# Patient Record
Sex: FEMALE | Race: WHITE | NOT HISPANIC OR LATINO
[De-identification: names, ages, dates, MRNs, and addresses within clinical notes are randomized per-mention and may not be internally consistent; named-entity substitution may affect disease eponyms.]

---

## 2023-01-31 PROBLEM — Z00.00 ENCOUNTER FOR PREVENTIVE HEALTH EXAMINATION: Status: ACTIVE | Noted: 2023-01-31

## 2023-02-01 ENCOUNTER — APPOINTMENT (OUTPATIENT)
Dept: OTOLARYNGOLOGY | Facility: CLINIC | Age: 23
End: 2023-02-01
Payer: COMMERCIAL

## 2023-02-01 ENCOUNTER — NON-APPOINTMENT (OUTPATIENT)
Age: 23
End: 2023-02-01

## 2023-02-01 ENCOUNTER — RESULT REVIEW (OUTPATIENT)
Age: 23
End: 2023-02-01

## 2023-02-01 VITALS
SYSTOLIC BLOOD PRESSURE: 127 MMHG | WEIGHT: 293 LBS | HEIGHT: 68 IN | DIASTOLIC BLOOD PRESSURE: 82 MMHG | HEART RATE: 89 BPM | BODY MASS INDEX: 44.41 KG/M2

## 2023-02-01 DIAGNOSIS — J00 ACUTE NASOPHARYNGITIS [COMMON COLD]: ICD-10-CM

## 2023-02-01 DIAGNOSIS — J35.01 CHRONIC TONSILLITIS: ICD-10-CM

## 2023-02-01 PROCEDURE — 99244 OFF/OP CNSLTJ NEW/EST MOD 40: CPT | Mod: 25

## 2023-02-01 PROCEDURE — 31231 NASAL ENDOSCOPY DX: CPT

## 2023-02-01 PROCEDURE — 70486 CT MAXILLOFACIAL W/O DYE: CPT

## 2023-02-01 NOTE — REVIEW OF SYSTEMS
[Post Nasal Drip] : post nasal drip [Ear Pain] : ear pain [Ear Drainage] : ear drainage [Nasal Congestion] : nasal congestion [Sinus Pressure] : sinus pressure [Sense Of Smell Problem] : sense of smell problem [Discolored Nasal Discharge] : discolored nasal discharge [Negative] : Heme/Lymph

## 2023-03-16 ENCOUNTER — APPOINTMENT (OUTPATIENT)
Dept: OTOLARYNGOLOGY | Facility: AMBULATORY MEDICAL SERVICES | Age: 23
End: 2023-03-16

## 2023-03-24 ENCOUNTER — APPOINTMENT (OUTPATIENT)
Dept: OTOLARYNGOLOGY | Facility: CLINIC | Age: 23
End: 2023-03-24

## 2023-04-05 NOTE — ADDENDUM
[FreeTextEntry1] : Documented by Marina Ramírez acting as scribe for Dr. Avila on 02/01/2023.\par \par All Medical record entries made by the scribe were at my, Dr. Avila,direction and personally dictated by me on 02/01/2023. I have reviewed the chart and agree that the record accurately reflects my personal performance of the history, physical exam, assessment and plan. I have also personally directed, reviewed, and agreed with the discharge instructions.

## 2023-04-05 NOTE — ASSESSMENT
11/05/18 0917   Provider Notification   Provider Name/Title Dr. Jones   Method of Notification At Bedside   Request Evaluate in Person   Notification Reason SVE;Membrane Status     Dr. Jones at bedside. SVE 1/25/-3. Attempted rupturing membranes with amniohook and fetal scalp electrode without success. Plan is to initiate pitocin for induction.   [FreeTextEntry1] : Reviewed and reconciled medications, allergies, PMHx, PSHx, SocHx, FMHx.\par \par Pt presents today c/o recurrent sinus infeftions - multiple times a year, PND, tonsillitis treated with 2 courses of abx, felt like she couldn't breathe but resolved now, right ear clogged. Pt notes she thinks it might have to do with her adenoids being enlarged. Pt notes she took Flonase nasal spray for pressure feeling and it resolved within a few days. \par \par Physical Exam -\par cerumen removed b/l\par maxillaries tender\par mildly deviated septum, mildly inflamed turbs \par tonsils class 2\par \par Flexible nasal endoscopy \par left: large middle turb, minimal adenoid tissue (20%)\par right: large polyp coming out of middle meatus\par \par Mini CT Sinus\par right maxillary polyp, ethmoid clean, sphenoids clean, frontals clean\par large turbinates\par right adenoid slightly larger than left\par \par Plan:\par Cerumen removed b/l. Flexible nasal endoscopy. Mini CT Sinus - interpreted by Dr. Avila and reviewed with the patient, pending radiologist review. Continue taking Flonase - 2 sprays bilaterally QD, spray laterally. Discussed r/b/a of maxillary and turbs and possible adenoids surgery. FU for surgery.

## 2023-04-05 NOTE — REASON FOR VISIT
[Initial Evaluation] : an initial evaluation for [FreeTextEntry2] : Rt Ear pressure/ discuss tonsil sx

## 2023-04-05 NOTE — PHYSICAL EXAM
[Midline] : trachea located in midline position [Normal] : orientation to person, place, and time: normal [FreeTextEntry8] : cerumen removed via curettage  [FreeTextEntry9] : cerumen removed via curettage  [de-identified] : mildly deviated septum right [de-identified] : mildly inflamed turbs  [de-identified] : class 2 [FreeTextEntry2] : maxillaries tender

## 2023-04-05 NOTE — CONSULT LETTER
[Dear  ___] : Dear  [unfilled], [Consult Letter:] : I had the pleasure of evaluating your patient, [unfilled]. [Please see my note below.] : Please see my note below. [Consult Closing:] : Thank you very much for allowing me to participate in the care of this patient.  If you have any questions, please do not hesitate to contact me. [Sincerely,] : Sincerely, [FreeTextEntry3] : Philipp Avila MD FACS

## 2023-04-05 NOTE — PROCEDURE
[Recalcitrant Symptoms] : recalcitrant symptoms  [None] : none [Flexible Endoscope] : examined with the flexible endoscope [FreeTextEntry6] : Procedure: Flexible Nasal Endoscopy: Risks, benefits, and alternatives of flexible endoscopy were explained to the patient. The patient gave oral consent to proceed. The flexible scope was inserted into the right nasal cavity. Endoscopy of the inferior and middle meatus was performed. large polyp coming out of middle meatus. Olfactory cleft was clear. Spheno-ethmoid recess is clear. Nasopharynx was clear. Turbinates were without mass. The procedure was repeated on the contralateral side with large middle turb, minimal adenoid tissue (20%).  [de-identified] : recurrent sinus infections

## 2023-04-05 NOTE — HISTORY OF PRESENT ILLNESS
[de-identified] : Pt presents today c/o recurrent sinus infetions - multiple times a year, PND, tonsillitis treated with 2 courses of abx, felt like she couldn't breathe but resolved now, right ear clogged. Pt notes she thinks it might have to do with her adenoids being enlarged. Pt notes she took Flonase nasal spray for pressure feeling and it resolved within a few days but she cant use nasal sprays continuous.

## 2023-04-13 ENCOUNTER — OUTPATIENT (OUTPATIENT)
Dept: OUTPATIENT SERVICES | Facility: HOSPITAL | Age: 23
LOS: 1 days | End: 2023-04-13
Payer: COMMERCIAL

## 2023-04-13 VITALS
HEART RATE: 79 BPM | OXYGEN SATURATION: 98 % | TEMPERATURE: 98 F | HEIGHT: 68 IN | WEIGHT: 293 LBS | DIASTOLIC BLOOD PRESSURE: 86 MMHG | SYSTOLIC BLOOD PRESSURE: 142 MMHG | RESPIRATION RATE: 16 BRPM

## 2023-04-13 DIAGNOSIS — J31.0 CHRONIC RHINITIS: ICD-10-CM

## 2023-04-13 DIAGNOSIS — J32.9 CHRONIC SINUSITIS, UNSPECIFIED: ICD-10-CM

## 2023-04-13 DIAGNOSIS — J33.9 NASAL POLYP, UNSPECIFIED: ICD-10-CM

## 2023-04-13 DIAGNOSIS — J00 ACUTE NASOPHARYNGITIS [COMMON COLD]: ICD-10-CM

## 2023-04-13 DIAGNOSIS — Z01.818 ENCOUNTER FOR OTHER PREPROCEDURAL EXAMINATION: ICD-10-CM

## 2023-04-13 DIAGNOSIS — Z98.890 OTHER SPECIFIED POSTPROCEDURAL STATES: Chronic | ICD-10-CM

## 2023-04-13 LAB
HCG SERPL-ACNC: <1 MIU/ML — SIGNIFICANT CHANGE UP
HCT VFR BLD CALC: 40.5 % — SIGNIFICANT CHANGE UP (ref 34.5–45)
HGB BLD-MCNC: 12.6 G/DL — SIGNIFICANT CHANGE UP (ref 11.5–15.5)
MCHC RBC-ENTMCNC: 26.7 PG — LOW (ref 27–34)
MCHC RBC-ENTMCNC: 31.1 GM/DL — LOW (ref 32–36)
MCV RBC AUTO: 85.8 FL — SIGNIFICANT CHANGE UP (ref 80–100)
NRBC # BLD: 0 /100 WBCS — SIGNIFICANT CHANGE UP (ref 0–0)
PLATELET # BLD AUTO: 249 K/UL — SIGNIFICANT CHANGE UP (ref 150–400)
RBC # BLD: 4.72 M/UL — SIGNIFICANT CHANGE UP (ref 3.8–5.2)
RBC # FLD: 13.4 % — SIGNIFICANT CHANGE UP (ref 10.3–14.5)
WBC # BLD: 5.75 K/UL — SIGNIFICANT CHANGE UP (ref 3.8–10.5)
WBC # FLD AUTO: 5.75 K/UL — SIGNIFICANT CHANGE UP (ref 3.8–10.5)

## 2023-04-13 PROCEDURE — 84702 CHORIONIC GONADOTROPIN TEST: CPT

## 2023-04-13 PROCEDURE — 36415 COLL VENOUS BLD VENIPUNCTURE: CPT

## 2023-04-13 PROCEDURE — G0463: CPT

## 2023-04-13 PROCEDURE — 85027 COMPLETE CBC AUTOMATED: CPT

## 2023-04-13 NOTE — H&P PST ADULT - NSICDXPASTMEDICALHX_GEN_ALL_CORE_FT
PAST MEDICAL HISTORY:  Mild hyperlipidemia     Nasal polyp     Recurrent sinus infections     Severe obesity (BMI >= 40)

## 2023-04-13 NOTE — H&P PST ADULT - NSICDXFAMILYHX_GEN_ALL_CORE_FT
FAMILY HISTORY:  Father  Still living? Unknown  FH: stroke, Age at diagnosis: Age Unknown  FH: type 2 diabetes, Age at diagnosis: Age Unknown    Grandparent  Still living? Unknown  FH: type 2 diabetes, Age at diagnosis: Age Unknown    Uncle  Still living? Yes, Estimated age: Age Unknown  FH: stroke, Age at diagnosis: Age Unknown  FH: type 2 diabetes, Age at diagnosis: Age Unknown

## 2023-04-13 NOTE — H&P PST ADULT - PROBLEM SELECTOR PLAN 4
Labs CBC and HCG  Medical clearance necessary  Pre op instructions reviewed and given.   No meds to take am of surgery.   Instructed to hold and/or avoid other NSAIDs and OTC supplements. Tylenol is ok. Verbalized understanding

## 2023-04-13 NOTE — H&P PST ADULT - HISTORY OF PRESENT ILLNESS
21 y/o female with PMH of recurrent sinus infections for PST having Right Maxillary Antrostomy - Removal Contents- Bilateral Electrosubmucous Resection Turbinectomy- Adenoidectomy with Tracker by Dr. Avila on 4/20/2023.  She reports multiple sinus, ear, and throat infections the past 6 months.  Seen by ENT told of polyp here for elective corrective procedure.

## 2023-04-13 NOTE — H&P PST ADULT - PROBLEM SELECTOR PLAN 3
Right Maxillary Antrostomy - Removal Contents- Bilateral Electrosubmucous Resection Turbinectomy- Adenoidectomy with Tracker by Dr. Avila on 4/20/2023.

## 2023-04-19 ENCOUNTER — TRANSCRIPTION ENCOUNTER (OUTPATIENT)
Age: 23
End: 2023-04-19

## 2023-04-19 RX ORDER — METOCLOPRAMIDE HCL 10 MG
10 TABLET ORAL ONCE
Refills: 0 | Status: DISCONTINUED | OUTPATIENT
Start: 2023-04-20 | End: 2023-04-21

## 2023-04-19 RX ORDER — OXYCODONE HYDROCHLORIDE 5 MG/1
5 TABLET ORAL ONCE
Refills: 0 | Status: DISCONTINUED | OUTPATIENT
Start: 2023-04-20 | End: 2023-04-21

## 2023-04-19 RX ORDER — HYDROMORPHONE HYDROCHLORIDE 2 MG/ML
0.5 INJECTION INTRAMUSCULAR; INTRAVENOUS; SUBCUTANEOUS
Refills: 0 | Status: DISCONTINUED | OUTPATIENT
Start: 2023-04-20 | End: 2023-04-21

## 2023-04-19 RX ORDER — SODIUM CHLORIDE 9 MG/ML
1000 INJECTION, SOLUTION INTRAVENOUS
Refills: 0 | Status: DISCONTINUED | OUTPATIENT
Start: 2023-04-20 | End: 2023-04-21

## 2023-04-19 NOTE — ASU PATIENT PROFILE, ADULT - FALL HARM RISK - UNIVERSAL INTERVENTIONS
Bed in lowest position, wheels locked, appropriate side rails in place/Call bell, personal items and telephone in reach/Instruct patient to call for assistance before getting out of bed or chair/Non-slip footwear when patient is out of bed/[place] to call system/Physically safe environment - no spills, clutter or unnecessary equipment/Purposeful Proactive Rounding/Room/bathroom lighting operational, light cord in reach

## 2023-04-19 NOTE — ASU PATIENT PROFILE, ADULT - SITE
Right Maxillary Antrostomy- Removal Contents- Bilateral Electrosubmucous Resection Turbinectomy- Adenoidectomy With Tracker

## 2023-04-20 ENCOUNTER — APPOINTMENT (OUTPATIENT)
Dept: OTOLARYNGOLOGY | Facility: HOSPITAL | Age: 23
End: 2023-04-20

## 2023-04-20 ENCOUNTER — RESULT REVIEW (OUTPATIENT)
Age: 23
End: 2023-04-20

## 2023-04-20 ENCOUNTER — TRANSCRIPTION ENCOUNTER (OUTPATIENT)
Age: 23
End: 2023-04-20

## 2023-04-20 ENCOUNTER — OUTPATIENT (OUTPATIENT)
Dept: OUTPATIENT SERVICES | Facility: HOSPITAL | Age: 23
LOS: 1 days | End: 2023-04-20
Payer: COMMERCIAL

## 2023-04-20 VITALS
HEART RATE: 83 BPM | OXYGEN SATURATION: 99 % | SYSTOLIC BLOOD PRESSURE: 123 MMHG | DIASTOLIC BLOOD PRESSURE: 75 MMHG | RESPIRATION RATE: 15 BRPM | WEIGHT: 293 LBS | TEMPERATURE: 98 F | HEIGHT: 68 IN

## 2023-04-20 VITALS
HEART RATE: 86 BPM | SYSTOLIC BLOOD PRESSURE: 137 MMHG | DIASTOLIC BLOOD PRESSURE: 80 MMHG | RESPIRATION RATE: 16 BRPM | OXYGEN SATURATION: 98 %

## 2023-04-20 DIAGNOSIS — J00 ACUTE NASOPHARYNGITIS [COMMON COLD]: ICD-10-CM

## 2023-04-20 DIAGNOSIS — J33.9 NASAL POLYP, UNSPECIFIED: ICD-10-CM

## 2023-04-20 DIAGNOSIS — Z98.890 OTHER SPECIFIED POSTPROCEDURAL STATES: Chronic | ICD-10-CM

## 2023-04-20 DIAGNOSIS — J31.0 CHRONIC RHINITIS: ICD-10-CM

## 2023-04-20 DIAGNOSIS — J32.9 CHRONIC SINUSITIS, UNSPECIFIED: ICD-10-CM

## 2023-04-20 PROBLEM — E78.5 HYPERLIPIDEMIA, UNSPECIFIED: Chronic | Status: ACTIVE | Noted: 2023-04-13

## 2023-04-20 PROCEDURE — 88305 TISSUE EXAM BY PATHOLOGIST: CPT | Mod: 26

## 2023-04-20 PROCEDURE — 30802 ABLATE INF TURBINATE SUBMUC: CPT

## 2023-04-20 PROCEDURE — 31267 ENDOSCOPY MAXILLARY SINUS: CPT | Mod: RT

## 2023-04-20 PROCEDURE — 42831 REMOVAL OF ADENOIDS: CPT

## 2023-04-20 PROCEDURE — 88305 TISSUE EXAM BY PATHOLOGIST: CPT

## 2023-04-20 PROCEDURE — 30140 RESECT INFERIOR TURBINATE: CPT | Mod: 50

## 2023-04-20 PROCEDURE — C9399: CPT

## 2023-04-20 PROCEDURE — 31240 NSL/SNS NDSC CNCH BULL RESCJ: CPT | Mod: 50

## 2023-04-20 PROCEDURE — 31255 NSL/SINS NDSC W/TOT ETHMDCT: CPT | Mod: RT

## 2023-04-20 PROCEDURE — 88311 DECALCIFY TISSUE: CPT | Mod: 26

## 2023-04-20 PROCEDURE — 88311 DECALCIFY TISSUE: CPT

## 2023-04-20 RX ORDER — PREGABALIN 225 MG/1
1 CAPSULE ORAL
Refills: 0 | DISCHARGE

## 2023-04-20 RX ORDER — CEPHALEXIN 500 MG/1
500 CAPSULE ORAL
Qty: 20 | Refills: 0 | Status: ACTIVE | COMMUNITY
Start: 2023-04-20 | End: 1900-01-01

## 2023-04-20 RX ORDER — CEFAZOLIN SODIUM 1 G
3000 VIAL (EA) INJECTION ONCE
Refills: 0 | Status: COMPLETED | OUTPATIENT
Start: 2023-04-20 | End: 2023-04-20

## 2023-04-20 RX ORDER — CHOLECALCIFEROL (VITAMIN D3) 125 MCG
1 CAPSULE ORAL
Refills: 0 | DISCHARGE

## 2023-04-20 RX ORDER — FERROUS SULFATE 325(65) MG
1 TABLET ORAL
Refills: 0 | DISCHARGE

## 2023-04-20 RX ORDER — FOLIC ACID 0.8 MG
1 TABLET ORAL
Refills: 0 | DISCHARGE

## 2023-04-20 RX ORDER — ACETAMINOPHEN AND CODEINE 300; 30 MG/1; MG/1
300-30 TABLET ORAL
Qty: 20 | Refills: 0 | Status: ACTIVE | COMMUNITY
Start: 2023-04-20 | End: 1900-01-01

## 2023-04-20 RX ADMIN — SODIUM CHLORIDE 150 MILLILITER(S): 9 INJECTION, SOLUTION INTRAVENOUS at 07:36

## 2023-04-20 RX ADMIN — SODIUM CHLORIDE 150 MILLILITER(S): 9 INJECTION, SOLUTION INTRAVENOUS at 09:40

## 2023-04-20 NOTE — BRIEF OPERATIVE NOTE - OPERATION/FINDINGS
right maxillary and ethmoid with polypoid disease. bilateral middle turb delbert bullosa. bilateral inferior turbinate hypertrphy, adenoid hypertrophy

## 2023-04-20 NOTE — BRIEF OPERATIVE NOTE - NSICDXBRIEFPROCEDURE_GEN_ALL_CORE_FT
PROCEDURES:  Endoscopic maxillary antrotomy 20-Apr-2023 09:51:44  Philipp Avila  Endoscopic total ethmoidectomy 20-Apr-2023 09:52:36  Philipp Avila  Adenoidectomy and turbinectomy 20-Apr-2023 09:53:25  Philipp Avila

## 2023-04-20 NOTE — BRIEF OPERATIVE NOTE - NSICDXBRIEFPREOP_GEN_ALL_CORE_FT
PRE-OP DIAGNOSIS:  Chronic maxillary sinusitis 20-Apr-2023 09:53:46  Philipp Avila  Chronic ethmoidal sinusitis 20-Apr-2023 09:53:56  Philipp Avila  Hypertrophy of inferior nasal turbinate 20-Apr-2023 09:54:05  Philipp Avila  Paula bullosa 20-Apr-2023 09:54:15  Philipp Avila  Obstructive adenoid tissue 20-Apr-2023 09:54:29  Philipp Avila

## 2023-04-20 NOTE — ASU DISCHARGE PLAN (ADULT/PEDIATRIC) - CARE PROVIDER_API CALL
Philipp Avila)  Otolaryngology  [address], [address]  [place] [address]  Phone: [phone]  Fax: [phone]  Follow Up Time: 2 weeks

## 2023-04-20 NOTE — ASU DISCHARGE PLAN (ADULT/PEDIATRIC) - ASU DC SPECIAL INSTRUCTIONSFT
Breath through the mouth for next 72 hours  Sneeze through the mouth until post op appointment  Do not wiggle nose or move nose 72 hours  Saline spray 6 times a day  Place light ice on nose and keep yourself elevated.  Keep head elevated and sleep with three pillows to keep yourself elevated while asleep  No nose blowing.   Diet tolerated    Take following medications as prescribed:  Take keflex 500 twice a day 10 days  Take Tylenol and Codeine as needed     You can expect fatigue, nasal stuffiness, and mild nasal drainage after your surgery. Pain is generally mild with this type of surgery and is typically well controlled with pain medications by mouth. The stuffiness typically results from swelling after the procedure, and typically starts to improve after the first week. You may have drainage of some mucus and blood from your nose after surgery. This is a normal part of the healing process.

## 2023-04-20 NOTE — ASU DISCHARGE PLAN (ADULT/PEDIATRIC) - PROCEDURE
Right Maxillary Antrostomy - Removal Contents- Bilateral Electrosubmucous Resection Turbinectomy- Adenoidectomy with Tracker

## 2023-04-20 NOTE — BRIEF OPERATIVE NOTE - NSICDXBRIEFPOSTOP_GEN_ALL_CORE_FT
POST-OP DIAGNOSIS:  Chronic ethmoidal sinusitis 20-Apr-2023 09:54:50  Philipp Avila  Maxillary sinusitis, chronic 20-Apr-2023 09:55:03  Philipp Avila  Hypertrophy of inferior nasal turbinate 20-Apr-2023 09:55:12  Philipp Avila  Paula bullosa 20-Apr-2023 09:55:20  Philipp Avila  Adenoid hypertrophy 20-Apr-2023 09:55:33  Philipp Avila

## 2023-04-21 ENCOUNTER — APPOINTMENT (OUTPATIENT)
Dept: OTOLARYNGOLOGY | Facility: CLINIC | Age: 23
End: 2023-04-21
Payer: COMMERCIAL

## 2023-04-21 VITALS
HEART RATE: 80 BPM | DIASTOLIC BLOOD PRESSURE: 79 MMHG | WEIGHT: 293 LBS | HEIGHT: 68 IN | SYSTOLIC BLOOD PRESSURE: 115 MMHG | BODY MASS INDEX: 44.41 KG/M2

## 2023-04-21 DIAGNOSIS — J33.9 NASAL POLYP, UNSPECIFIED: ICD-10-CM

## 2023-04-21 PROBLEM — J32.9 CHRONIC SINUSITIS, UNSPECIFIED: Chronic | Status: ACTIVE | Noted: 2023-04-13

## 2023-04-21 PROBLEM — E66.01 MORBID (SEVERE) OBESITY DUE TO EXCESS CALORIES: Chronic | Status: ACTIVE | Noted: 2023-04-13

## 2023-04-21 LAB — SURGICAL PATHOLOGY STUDY: SIGNIFICANT CHANGE UP

## 2023-04-21 PROCEDURE — 99024 POSTOP FOLLOW-UP VISIT: CPT

## 2023-04-21 NOTE — CONSULT LETTER
[Dear  ___] : Dear  [unfilled], [Courtesy Letter:] : I had the pleasure of seeing your patient, [unfilled], in my office today. [Please see my note below.] : Please see my note below. [Consult Closing:] : Thank you very much for allowing me to participate in the care of this patient.  If you have any questions, please do not hesitate to contact me. [Sincerely,] : Sincerely, [FreeTextEntry1] : Philipp Avila MD FACS

## 2023-04-21 NOTE — HISTORY OF PRESENT ILLNESS
[de-identified] : Pt. with h/o recurrent sinus infections - multiple times a year, PND, and tonsillitis treated with 2 courses of abx presents today s/p right maxillary antrostomy with removal of contents, adenoidectomy, and b/l electro SMR turbs. Patient presents stating she is concerned because she cant breath through her nose at all. Patient states she has been using the saline spray. She states she used it 6 times yesterday.

## 2023-04-21 NOTE — REASON FOR VISIT
[Post-Operative Visit] : a post-operative visit [FreeTextEntry2] : adenoidectomy/ polyp removal  Complex Repair And Flap Additional Text (Will Appearing After The Standard Complex Repair Text): The complex repair was not sufficient to completely close the primary

## 2023-04-27 ENCOUNTER — APPOINTMENT (OUTPATIENT)
Dept: OTOLARYNGOLOGY | Facility: CLINIC | Age: 23
End: 2023-04-27
Payer: COMMERCIAL

## 2023-04-27 VITALS
DIASTOLIC BLOOD PRESSURE: 84 MMHG | WEIGHT: 293 LBS | SYSTOLIC BLOOD PRESSURE: 116 MMHG | HEIGHT: 68 IN | BODY MASS INDEX: 44.41 KG/M2 | HEART RATE: 96 BPM

## 2023-04-27 DIAGNOSIS — G89.18 OTHER ACUTE POSTPROCEDURAL PAIN: ICD-10-CM

## 2023-04-27 PROCEDURE — 99024 POSTOP FOLLOW-UP VISIT: CPT

## 2023-04-27 NOTE — CONSULT LETTER
[Dear  ___] : Dear  [unfilled], [Courtesy Letter:] : I had the pleasure of seeing your patient, [unfilled], in my office today. [Please see my note below.] : Please see my note below. [Referral Closing:] : Thank you very much for seeing this patient.  If you have any questions, please do not hesitate to contact me. [Sincerely,] : Sincerely, [FreeTextEntry3] : Philipp Avila MD FACS\par

## 2023-04-27 NOTE — HISTORY OF PRESENT ILLNESS
[de-identified] : Pt. with h/o recurrent sinus infections - multiple times a year, PND, and tonsillitis treated with 2 courses of abx presents today s/p right maxillary antrostomy with removal of contents, adenoidectomy, and b/l electro SMR turbs. Patient presents stating \par

## 2023-04-27 NOTE — PHYSICAL EXAM
[Normal] : no rashes [FreeTextEntry1] : some scabbing of the turbinates, blood, mucus, and packing in the nose. Removed with suction.

## 2023-05-16 ENCOUNTER — APPOINTMENT (OUTPATIENT)
Dept: OTOLARYNGOLOGY | Facility: CLINIC | Age: 23
End: 2023-05-16
Payer: COMMERCIAL

## 2023-05-16 VITALS
WEIGHT: 293 LBS | DIASTOLIC BLOOD PRESSURE: 85 MMHG | HEIGHT: 68 IN | SYSTOLIC BLOOD PRESSURE: 123 MMHG | HEART RATE: 76 BPM | BODY MASS INDEX: 44.41 KG/M2

## 2023-05-16 PROCEDURE — 99024 POSTOP FOLLOW-UP VISIT: CPT

## 2023-05-16 NOTE — HISTORY OF PRESENT ILLNESS
[de-identified] : Pt. with h/o recurrent sinus infections - multiple times a year, PND, and tonsillitis treated with 2 courses of abx presents today s/p right maxillary antrostomy with removal of contents, adenoidectomy, and b/l electro SMR turbs on 4/20/23. Patient presents today for follow-up postop.

## 2023-05-16 NOTE — PHYSICAL EXAM
[Normal] : orientation to person, place, and time: normal [de-identified] : crusting  [de-identified] : suctioned secretions

## 2023-05-16 NOTE — ASSESSMENT
[FreeTextEntry1] : Reviewed and Reconciled the pmhx, fmhx, sochx, and medhx\par \par Pt. with h/o recurrent sinus infections - multiple times a year, PND, and tonsillitis treated with 2 courses of abx presents today s/p right maxillary antrostomy with removal of contents, adenoidectomy, and b/l electro SMR turbs on 4/20/23. Patient presents today for follow-up postop. \par \par Physical Exam:\par Nasal crusting, suctioned secretions\par Foreign body instrument used\par \par Plan: Keep moist, use saline spray. FU in one month.

## 2023-05-16 NOTE — CONSULT LETTER
[Dear  ___] : Dear  [unfilled], [Courtesy Letter:] : I had the pleasure of seeing your patient, [unfilled], in my office today. [Please see my note below.] : Please see my note below. [Consult Closing:] : Thank you very much for allowing me to participate in the care of this patient.  If you have any questions, please do not hesitate to contact me. [Sincerely,] : Sincerely, [FreeTextEntry3] : Philipp Avila MD FACS

## 2023-06-20 ENCOUNTER — APPOINTMENT (OUTPATIENT)
Dept: OTOLARYNGOLOGY | Facility: CLINIC | Age: 23
End: 2023-06-20
Payer: COMMERCIAL

## 2023-06-20 VITALS
WEIGHT: 293 LBS | DIASTOLIC BLOOD PRESSURE: 78 MMHG | BODY MASS INDEX: 44.41 KG/M2 | SYSTOLIC BLOOD PRESSURE: 114 MMHG | HEIGHT: 68 IN | HEART RATE: 83 BPM

## 2023-06-20 PROCEDURE — 99024 POSTOP FOLLOW-UP VISIT: CPT

## 2023-06-20 NOTE — ADDENDUM
[FreeTextEntry1] : Documented by Radha Turner acting as a scribe for Dr. Avila on [mm//dd/yyyy]. \par \par All Medical record entries made by the scribe were at my, Dr. Avila, direction and personally directed by me on 06/20/2023. I have reviewed the chart and agree that the record accurately reflects my personal performance of the history, physical exam, assessment, and plan. I have also personally directed, reviewed, and agreed with the discharge instructions.

## 2023-06-20 NOTE — ASSESSMENT
[FreeTextEntry1] : Reviewed and Reconciled the pmhx, fmhx, sochx, and medhx\par Pt. with h/o recurrent sinus infections - multiple times a year, PND, and tonsillitis treated with 2 courses of abx presents today s/p right maxillary antrostomy with removal of contents, adenoidectomy, and b/l electro SMR turbs on 4/20/23. Patient presents today for follow-up postop. The patient noticed crusting of nose yesterday. She has not been using saline spray. \par \par Physical Exam:\par large scab/crust inf terb left side, suctioned\par \par Plan: Use saline spray. FU 1 month

## 2023-06-20 NOTE — HISTORY OF PRESENT ILLNESS
[de-identified] : Pt. with h/o recurrent sinus infections - multiple times a year, PND, and tonsillitis treated with 2 courses of abx presents today s/p right maxillary antrostomy with removal of contents, adenoidectomy, and b/l electro SMR turbs on 4/20/23. Patient presents today for follow-up postop. The patient noticed crusting of nose yesterday. She has not been using saline spray.

## 2023-07-18 ENCOUNTER — APPOINTMENT (OUTPATIENT)
Dept: OTOLARYNGOLOGY | Facility: CLINIC | Age: 23
End: 2023-07-18
Payer: COMMERCIAL

## 2023-07-18 VITALS
WEIGHT: 293 LBS | BODY MASS INDEX: 44.41 KG/M2 | DIASTOLIC BLOOD PRESSURE: 84 MMHG | HEART RATE: 73 BPM | HEIGHT: 68 IN | SYSTOLIC BLOOD PRESSURE: 136 MMHG

## 2023-07-18 DIAGNOSIS — Z98.890 OTHER SPECIFIED POSTPROCEDURAL STATES: ICD-10-CM

## 2023-07-18 DIAGNOSIS — J34.89 OTHER SPECIFIED DISORDERS OF NOSE AND NASAL SINUSES: ICD-10-CM

## 2023-07-18 PROCEDURE — 99024 POSTOP FOLLOW-UP VISIT: CPT

## 2023-07-19 PROBLEM — J34.89 NASAL CRUSTING: Status: ACTIVE | Noted: 2023-06-20

## 2023-07-19 PROBLEM — Z98.890 POST-OPERATIVE STATE: Status: ACTIVE | Noted: 2023-04-20

## 2023-07-19 NOTE — PHYSICAL EXAM
[Midline] : trachea located in midline position [Normal] : no rashes [Hearing Loss Right Only] : normal [Hearing Loss Left Only] : normal [de-identified] : small scab/crust on the inferior left turbinate, suctioned clear

## 2023-07-19 NOTE — ASSESSMENT
[FreeTextEntry1] : Reviewed and reconciled medications, allergies, PMHx, PSHx, SocHx, FMHx.\par \par \par physical exam:\par mildly deviated septum\par small scab/crust on the inferior left turbinate, suctioned clear\par tonsil class 2\par \par \par Plan:\par Follow up in 6 months.\par Continue saline sprays\par \par \par Case discussed with Dr. Avila\par

## 2023-07-19 NOTE — HISTORY OF PRESENT ILLNESS
[de-identified] : Pt. with h/o recurrent sinus infections - multiple times a year, PND, and tonsillitis treated with 2 courses of abx presents today s/p right maxillary antrostomy with removal of contents, adenoidectomy, and b/l electro SMR turbs on 4/20/23. Patient has nasal congestion on the left side.  The patient has been using saline spray.

## 2023-07-19 NOTE — CONSULT LETTER
[Dear  ___] : Dear  [unfilled], [Courtesy Letter:] : I had the pleasure of seeing your patient, [unfilled], in my office today. [Please see my note below.] : Please see my note below. [Referral Closing:] : Thank you very much for seeing this patient.  If you have any questions, please do not hesitate to contact me. [Sincerely,] : Sincerely, [FreeTextEntry3] : Андрей Harrison PA-C\par

## 2024-02-02 ENCOUNTER — APPOINTMENT (OUTPATIENT)
Dept: OTOLARYNGOLOGY | Facility: CLINIC | Age: 24
End: 2024-02-02
Payer: COMMERCIAL

## 2024-02-02 VITALS
SYSTOLIC BLOOD PRESSURE: 125 MMHG | WEIGHT: 293 LBS | HEART RATE: 74 BPM | DIASTOLIC BLOOD PRESSURE: 86 MMHG | HEIGHT: 68 IN | BODY MASS INDEX: 44.41 KG/M2

## 2024-02-02 DIAGNOSIS — Z77.22 CONTACT WITH AND (SUSPECTED) EXPOSURE TO ENVIRONMENTAL TOBACCO SMOKE (ACUTE) (CHRONIC): ICD-10-CM

## 2024-02-02 DIAGNOSIS — Z86.2 PERSONAL HISTORY OF DISEASES OF THE BLOOD AND BLOOD-FORMING ORGANS AND CERTAIN DISORDERS INVOLVING THE IMMUNE MECHANISM: ICD-10-CM

## 2024-02-02 DIAGNOSIS — J32.0 CHRONIC MAXILLARY SINUSITIS: ICD-10-CM

## 2024-02-02 DIAGNOSIS — Z83.3 FAMILY HISTORY OF DIABETES MELLITUS: ICD-10-CM

## 2024-02-02 DIAGNOSIS — Z78.9 OTHER SPECIFIED HEALTH STATUS: ICD-10-CM

## 2024-02-02 DIAGNOSIS — J31.0 CHRONIC RHINITIS: ICD-10-CM

## 2024-02-02 DIAGNOSIS — J34.89 OTHER SPECIFIED DISORDERS OF NOSE AND NASAL SINUSES: ICD-10-CM

## 2024-02-02 DIAGNOSIS — M95.0 ACQUIRED DEFORMITY OF NOSE: ICD-10-CM

## 2024-02-02 DIAGNOSIS — J32.9 CHRONIC SINUSITIS, UNSPECIFIED: ICD-10-CM

## 2024-02-02 PROCEDURE — 99214 OFFICE O/P EST MOD 30 MIN: CPT | Mod: 25

## 2024-02-02 PROCEDURE — 31231 NASAL ENDOSCOPY DX: CPT

## 2024-02-02 NOTE — PROCEDURE
[Recalcitrant Symptoms] : recalcitrant symptoms  [FreeTextEntry1] : Rigid Nasal Endoscopy w/ Bilateral Sinus Debridement [FreeTextEntry2] : mucus coming from right maxillary sinus [FreeTextEntry3] :  Procedure: Rigid Nasal Endoscopy w/ Bilateral Sinus Debridement: Risks, benefits, and alternatives of rigid endoscopy were explained to the patient. The patient gave oral consent to proceed. The 0 degree scope was inserted into the right nasal cavity. Endoscopy of the inferior and middle meatus was performed. Spheno-ethmoid recess clear. Nasopharynx was clear. Turbinates were without mass.The procedure was repeated on the contralateral side with similar findings. Middle meatus and sinus debridement done bilaterally. The patient tolerated the procedure well. [FreeTextEntry6] :  Procedure: Flexible Nasal Endoscopy: Risks, benefits, and alternatives of flexible endoscopy were explained to the patient. The patient gave oral consent to proceed. The flexible scope was inserted into the right nasal cavity. Endoscopy of the inferior and middle meatus was performed. No polyp, mass, or lesion was appreciated. Olfactory cleft was clear. Spheno-ethmoid recess is clear. Nasopharynx was clear. Turbinates were without mass. The procedure was repeated on the contralateral side with similar findings.

## 2024-02-02 NOTE — ASSESSMENT
[FreeTextEntry1] :  Reviewed and reconciled medications, allergies, PMHx, PSHx, SocHx, FMHx.  Pt. with h/o recurrent sinus infections - multiple times a year, PND, and tonsillitis treated with 2 courses of abx presents today s/p right maxillary antrostomy with removal of contents, adenoidectomy, and b/l electro SMR turbs on 4/20/23. Pt states that her nose feels good, but is a little stuffy. Pt denies using nasal sprays.   Physical exam: +ricardo -nasal valve collapse -class 2 tonsils  ENT Procedure:  Flexible nasal endoscopy -sinusosteam open and clear -right side open and clear -mucus coming from right maxillary sinus -left side open  Plan: -discussed r/b/a of [place] office procedure for nasal valve repair -Start sinus rinse -FU after surgery

## 2024-02-02 NOTE — HISTORY OF PRESENT ILLNESS
[de-identified] : Pt. with h/o recurrent sinus infections - multiple times a year, PND, and tonsillitis treated with 2 courses of abx presents today s/p right maxillary antrostomy with removal of contents, adenoidectomy, and b/l electro SMR turbs on 4/20/23. Pt states that her nose feels good, but is a little stuffy. Pt denies using nasal sprays.

## 2024-02-02 NOTE — PHYSICAL EXAM
[Midline] : trachea located in midline position [Normal] : orientation to person, place, and time: normal [de-identified] : +ricardo. nasal valve collapse [de-identified] : class 2 [FreeTextEntry2] :  sinuses nontender to percussion.  sensations intact

## 2024-03-12 ENCOUNTER — APPOINTMENT (OUTPATIENT)
Dept: OTOLARYNGOLOGY | Facility: CLINIC | Age: 24
End: 2024-03-12

## (undated) DEVICE — SYR LUER LOK 10CC

## (undated) DEVICE — SUT PLAIN GUT 4-0 18" SC-1

## (undated) DEVICE — CATH IV SAFE INSYTE 14G X 1.75" (ORANGE)

## (undated) DEVICE — Device

## (undated) DEVICE — DRAPE TOWEL BLUE 17" X 24"

## (undated) DEVICE — ELCTR BOVIE SUCTION 10FR

## (undated) DEVICE — SPECIMEN CONTAINER 4OZ

## (undated) DEVICE — STRYKER INSTRUMENT CLAMP SPHERE

## (undated) DEVICE — PLV-SCD MACHINE: Type: DURABLE MEDICAL EQUIPMENT

## (undated) DEVICE — SUT CHROMIC 3-0 30" V-20

## (undated) DEVICE — SOL IRR POUR H2O 1000ML

## (undated) DEVICE — PACK NASAL MEROGEL 4X4CM

## (undated) DEVICE — PLV/PSP-ESU FORCEFX F8I7418A: Type: DURABLE MEDICAL EQUIPMENT

## (undated) DEVICE — SOL ANTI FOG

## (undated) DEVICE — DRAPE INSTRUMENT POUCH 6.75" X 11"

## (undated) DEVICE — DRAPE SPLIT SHEET 77" X 108"

## (undated) DEVICE — VENODYNE/SCD SLEEVE CALF LARGE

## (undated) DEVICE — SOL INJ NS 0.9% 1000ML

## (undated) DEVICE — FRAZIER MALLEABLE SUCTION (STR) 3MM

## (undated) DEVICE — WARMING BLANKET LOWER ADULT

## (undated) DEVICE — SUT POLYSORB 4-0 18" P-12 UNDYED

## (undated) DEVICE — SUCTION YANKAUER TAPERED BULBOUS NO VENT

## (undated) DEVICE — DRSG MEROCEL STANDARD NO STRING 8CM X 2CM

## (undated) DEVICE — SOL INJ NS 0.9% 500ML 2 PORT

## (undated) DEVICE — EICKEN MALLEABLE SUCTION (CVD) 3MM

## (undated) DEVICE — TUBING DIEGO SUCTION IRRIGATION 12FT

## (undated) DEVICE — SHAVER BLADE GYRUS DIEGO 4MM STRAIGHT

## (undated) DEVICE — SUT CHROMIC 4-0 18" P-3

## (undated) DEVICE — SUT SURGIPRO II 4-0 18" P-12

## (undated) DEVICE — S&N ARTHROCARE ENT WAND PROCISE XP

## (undated) DEVICE — PACK T & A

## (undated) DEVICE — CATH NG SALEM SUMP 10FR

## (undated) DEVICE — CATH NG SUMP SALEM 18FR X 48"

## (undated) DEVICE — PATIENT TRACKER TABS (ADHESIVE PADS)

## (undated) DEVICE — DRSG NASOPORE 8CM

## (undated) DEVICE — SYR LUER LOK 20CC

## (undated) DEVICE — TUBING IV EXTENSION 30"

## (undated) DEVICE — PACK NASAL

## (undated) DEVICE — SOL IRR POUR NS 0.9% 1000ML

## (undated) DEVICE — VENODYNE/SCD SLEEVE CALF MEDIUM

## (undated) DEVICE — POLISHER OR CAUTERY TIP

## (undated) DEVICE — CATH NG SALEM SUMP 16FR

## (undated) DEVICE — GOWN XXL

## (undated) DEVICE — GLV 9 PROTEXIS (WHITE)

## (undated) DEVICE — SUCTION YANKAUER OPEN TIP NO VENT CURVE

## (undated) DEVICE — NDL HYPO REGULAR BEVEL 25G X 1.5" (BLUE)

## (undated) DEVICE — ELCTR BOVIE SUCTION 8FR 6"

## (undated) DEVICE — ELCTR BOVIE TIP BLADE INSULATED 2.75" EDGE

## (undated) DEVICE — NDL HYPO REGULAR BEVEL 27G X 1.25" (GRAY)

## (undated) DEVICE — SPONGE XRAY CYLINDRICAL

## (undated) DEVICE — DRSG TELFA 3 X 4

## (undated) DEVICE — GLV 9 PROTEXIS ORTHO (CREAM)